# Patient Record
Sex: MALE | Race: WHITE | HISPANIC OR LATINO | Employment: FULL TIME | URBAN - METROPOLITAN AREA
[De-identification: names, ages, dates, MRNs, and addresses within clinical notes are randomized per-mention and may not be internally consistent; named-entity substitution may affect disease eponyms.]

---

## 2020-09-19 ENCOUNTER — APPOINTMENT (OUTPATIENT)
Dept: RADIOLOGY | Facility: MEDICAL CENTER | Age: 35
End: 2020-09-19
Payer: OTHER MISCELLANEOUS

## 2020-09-19 ENCOUNTER — APPOINTMENT (OUTPATIENT)
Dept: RADIOLOGY | Facility: MEDICAL CENTER | Age: 35
End: 2020-09-19
Attending: EMERGENCY MEDICINE
Payer: OTHER MISCELLANEOUS

## 2020-09-19 ENCOUNTER — HOSPITAL ENCOUNTER (EMERGENCY)
Facility: MEDICAL CENTER | Age: 35
End: 2020-09-19
Attending: EMERGENCY MEDICINE
Payer: OTHER MISCELLANEOUS

## 2020-09-19 VITALS
HEART RATE: 91 BPM | TEMPERATURE: 98 F | RESPIRATION RATE: 18 BRPM | WEIGHT: 190 LBS | BODY MASS INDEX: 30.53 KG/M2 | SYSTOLIC BLOOD PRESSURE: 120 MMHG | HEIGHT: 66 IN | DIASTOLIC BLOOD PRESSURE: 78 MMHG | OXYGEN SATURATION: 98 %

## 2020-09-19 DIAGNOSIS — S20.219A CONTUSION OF CHEST WALL, UNSPECIFIED LATERALITY, INITIAL ENCOUNTER: ICD-10-CM
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
## 2020-09-19 DIAGNOSIS — S16.1XXA STRAIN OF NECK MUSCLE, INITIAL ENCOUNTER: ICD-10-CM

## 2020-09-19 DIAGNOSIS — S09.90XA CLOSED HEAD INJURY, INITIAL ENCOUNTER: ICD-10-CM

## 2020-09-19 DIAGNOSIS — S39.012A STRAIN OF LUMBAR REGION, INITIAL ENCOUNTER: ICD-10-CM

## 2020-09-19 PROCEDURE — 700102 HCHG RX REV CODE 250 W/ 637 OVERRIDE(OP): Performed by: EMERGENCY MEDICINE

## 2020-09-19 PROCEDURE — A9270 NON-COVERED ITEM OR SERVICE: HCPCS | Performed by: EMERGENCY MEDICINE

## 2020-09-19 PROCEDURE — 70450 CT HEAD/BRAIN W/O DYE: CPT

## 2020-09-19 PROCEDURE — 99284 EMERGENCY DEPT VISIT MOD MDM: CPT

## 2020-09-19 PROCEDURE — 305948 HCHG GREEN TRAUMA ACT PRE-NOTIFY NO CC

## 2020-09-19 PROCEDURE — 72100 X-RAY EXAM L-S SPINE 2/3 VWS: CPT

## 2020-09-19 PROCEDURE — 71045 X-RAY EXAM CHEST 1 VIEW: CPT

## 2020-09-19 PROCEDURE — 72125 CT NECK SPINE W/O DYE: CPT

## 2020-09-19 RX ORDER — HYDROCODONE BITARTRATE AND ACETAMINOPHEN 5; 325 MG/1; MG/1
1 TABLET ORAL ONCE
Status: COMPLETED | OUTPATIENT
Start: 2020-09-19 | End: 2020-09-19

## 2020-09-19 RX ADMIN — HYDROCODONE BITARTRATE AND ACETAMINOPHEN 1 TABLET: 5; 325 TABLET ORAL at 17:14

## 2020-09-19 SDOH — HEALTH STABILITY: MENTAL HEALTH: HOW OFTEN DO YOU HAVE A DRINK CONTAINING ALCOHOL?: NEVER

## 2020-09-19 ASSESSMENT — LIFESTYLE VARIABLES: DO YOU DRINK ALCOHOL: NO

## 2020-09-19 NOTE — Clinical Note
Antonella Sanders was seen and treated in our emergency department on 9/19/2020.  He may return to work on 09/21/2020.       If you have any questions or concerns, please don't hesitate to call.      Mg Moon M.D.
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
## 2020-09-20 NOTE — ED NOTES
Pt discharge home. Pt given discharge instructions  Pt verbalized understanding, all questions answered ,vss upon d/c. Pt steady on feet upon discharge.family will be driving pt home

## 2021-04-13 ENCOUNTER — IMMUNIZATION (OUTPATIENT)
Dept: FAMILY PLANNING/WOMEN'S HEALTH CLINIC | Facility: IMMUNIZATION CENTER | Age: 36
End: 2021-04-13
Payer: OTHER GOVERNMENT

## 2021-04-13 DIAGNOSIS — Z23 ENCOUNTER FOR VACCINATION: Primary | ICD-10-CM

## 2021-04-13 PROCEDURE — 91300 PFIZER SARS-COV-2 VACCINE: CPT

## 2021-04-13 PROCEDURE — 0001A PFIZER SARS-COV-2 VACCINE: CPT

## 2021-05-14 ENCOUNTER — IMMUNIZATION (OUTPATIENT)
Dept: FAMILY PLANNING/WOMEN'S HEALTH CLINIC | Facility: IMMUNIZATION CENTER | Age: 36
End: 2021-05-14
Attending: INTERNAL MEDICINE
Payer: OTHER GOVERNMENT

## 2021-05-14 DIAGNOSIS — Z23 ENCOUNTER FOR VACCINATION: Primary | ICD-10-CM

## 2021-05-14 PROCEDURE — 0002A PFIZER SARS-COV-2 VACCINE: CPT

## 2021-05-14 PROCEDURE — 91300 PFIZER SARS-COV-2 VACCINE: CPT

## 2022-11-06 ENCOUNTER — HOSPITAL ENCOUNTER (EMERGENCY)
Facility: MEDICAL CENTER | Age: 37
End: 2022-11-06
Attending: EMERGENCY MEDICINE

## 2022-11-06 ENCOUNTER — APPOINTMENT (OUTPATIENT)
Dept: RADIOLOGY | Facility: MEDICAL CENTER | Age: 37
End: 2022-11-06
Attending: EMERGENCY MEDICINE

## 2022-11-06 VITALS
RESPIRATION RATE: 20 BRPM | HEART RATE: 83 BPM | BODY MASS INDEX: 32.24 KG/M2 | OXYGEN SATURATION: 93 % | WEIGHT: 200.62 LBS | HEIGHT: 66 IN | SYSTOLIC BLOOD PRESSURE: 106 MMHG | DIASTOLIC BLOOD PRESSURE: 71 MMHG | TEMPERATURE: 98.8 F

## 2022-11-06 DIAGNOSIS — S41.112A LACERATION OF LEFT UPPER EXTREMITY, INITIAL ENCOUNTER: ICD-10-CM

## 2022-11-06 PROCEDURE — 90471 IMMUNIZATION ADMIN: CPT

## 2022-11-06 PROCEDURE — A9270 NON-COVERED ITEM OR SERVICE: HCPCS | Performed by: EMERGENCY MEDICINE

## 2022-11-06 PROCEDURE — 90715 TDAP VACCINE 7 YRS/> IM: CPT | Performed by: EMERGENCY MEDICINE

## 2022-11-06 PROCEDURE — 304999 HCHG REPAIR-SIMPLE/INTERMED LEVEL 1

## 2022-11-06 PROCEDURE — 99284 EMERGENCY DEPT VISIT MOD MDM: CPT

## 2022-11-06 PROCEDURE — 700102 HCHG RX REV CODE 250 W/ 637 OVERRIDE(OP): Performed by: EMERGENCY MEDICINE

## 2022-11-06 PROCEDURE — 73080 X-RAY EXAM OF ELBOW: CPT | Mod: LT

## 2022-11-06 PROCEDURE — 700111 HCHG RX REV CODE 636 W/ 250 OVERRIDE (IP): Performed by: EMERGENCY MEDICINE

## 2022-11-06 PROCEDURE — 700101 HCHG RX REV CODE 250: Performed by: EMERGENCY MEDICINE

## 2022-11-06 PROCEDURE — 303747 HCHG EXTRA SUTURE

## 2022-11-06 PROCEDURE — 304217 HCHG IRRIGATION SYSTEM

## 2022-11-06 RX ORDER — CEPHALEXIN 500 MG/1
500 CAPSULE ORAL 4 TIMES DAILY
Qty: 12 CAPSULE | Refills: 0 | Status: SHIPPED | OUTPATIENT
Start: 2022-11-06 | End: 2022-11-09

## 2022-11-06 RX ORDER — BUPIVACAINE HYDROCHLORIDE AND EPINEPHRINE 5; 5 MG/ML; UG/ML
10 INJECTION, SOLUTION EPIDURAL; INTRACAUDAL; PERINEURAL ONCE
Status: COMPLETED | OUTPATIENT
Start: 2022-11-06 | End: 2022-11-06

## 2022-11-06 RX ORDER — LIDOCAINE HYDROCHLORIDE AND EPINEPHRINE BITARTRATE 20; .01 MG/ML; MG/ML
10 INJECTION, SOLUTION SUBCUTANEOUS ONCE
Status: DISCONTINUED | OUTPATIENT
Start: 2022-11-06 | End: 2022-11-06

## 2022-11-06 RX ORDER — CEPHALEXIN 500 MG/1
500 CAPSULE ORAL ONCE
Status: COMPLETED | OUTPATIENT
Start: 2022-11-06 | End: 2022-11-06

## 2022-11-06 RX ADMIN — BUPIVACAINE HYDROCHLORIDE AND EPINEPHRINE BITARTRATE 10 ML: 5; .005 INJECTION, SOLUTION EPIDURAL; INTRACAUDAL; PERINEURAL at 18:30

## 2022-11-06 RX ADMIN — CEPHALEXIN 500 MG: 500 CAPSULE ORAL at 18:30

## 2022-11-06 RX ADMIN — CLOSTRIDIUM TETANI TOXOID ANTIGEN (FORMALDEHYDE INACTIVATED), CORYNEBACTERIUM DIPHTHERIAE TOXOID ANTIGEN (FORMALDEHYDE INACTIVATED), BORDETELLA PERTUSSIS TOXOID ANTIGEN (GLUTARALDEHYDE INACTIVATED), BORDETELLA PERTUSSIS FILAMENTOUS HEMAGGLUTININ ANTIGEN (FORMALDEHYDE INACTIVATED), BORDETELLA PERTUSSIS PERTACTIN ANTIGEN, AND BORDETELLA PERTUSSIS FIMBRIAE 2/3 ANTIGEN 0.5 ML: 5; 2; 2.5; 5; 3; 5 INJECTION, SUSPENSION INTRAMUSCULAR at 17:48
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
## 2022-11-07 NOTE — DISCHARGE INSTRUCTIONS
You were seen in the ER for a cut on your left arm which I have placed dung stitches in.  You should return to the ER in 7 to 10 days for suture removal.  I sent you in a prescription for antibiotics, please take them as directed.  If you develop redness, puslike drainage, red streaking, or fevers you should return immediately to the ER for IV antibiotics.  Establish with a primary care physician and return immediately with new or worsening symptoms.  I hope you feel better soon!
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
## 2023-05-02 ENCOUNTER — APPOINTMENT (OUTPATIENT)
Dept: RADIOLOGY | Facility: MEDICAL CENTER | Age: 38
End: 2023-05-02
Attending: EMERGENCY MEDICINE

## 2023-05-02 ENCOUNTER — HOSPITAL ENCOUNTER (EMERGENCY)
Facility: MEDICAL CENTER | Age: 38
End: 2023-05-02
Attending: EMERGENCY MEDICINE

## 2023-05-02 VITALS
SYSTOLIC BLOOD PRESSURE: 111 MMHG | RESPIRATION RATE: 18 BRPM | OXYGEN SATURATION: 95 % | TEMPERATURE: 98.6 F | HEART RATE: 100 BPM | HEIGHT: 65 IN | WEIGHT: 188 LBS | DIASTOLIC BLOOD PRESSURE: 63 MMHG | BODY MASS INDEX: 31.32 KG/M2

## 2023-05-02 DIAGNOSIS — S51.811A LACERATION OF RIGHT FOREARM, INITIAL ENCOUNTER: ICD-10-CM

## 2023-05-02 LAB
ABO + RH BLD: NORMAL
ABO GROUP BLD: NORMAL
ALBUMIN SERPL BCP-MCNC: 3.7 G/DL (ref 3.2–4.9)
ALBUMIN/GLOB SERPL: 1.1 G/DL
ALP SERPL-CCNC: 142 U/L (ref 30–99)
ALT SERPL-CCNC: 44 U/L (ref 2–50)
ANION GAP SERPL CALC-SCNC: 15 MMOL/L (ref 7–16)
APTT PPP: 27.8 SEC (ref 24.7–36)
AST SERPL-CCNC: 33 U/L (ref 12–45)
BASOPHILS # BLD AUTO: 0.7 % (ref 0–1.8)
BASOPHILS # BLD: 0.08 K/UL (ref 0–0.12)
BILIRUB SERPL-MCNC: 0.3 MG/DL (ref 0.1–1.5)
BLD GP AB SCN SERPL QL: NORMAL
BUN SERPL-MCNC: 18 MG/DL (ref 8–22)
CALCIUM ALBUM COR SERPL-MCNC: 8.9 MG/DL (ref 8.5–10.5)
CALCIUM SERPL-MCNC: 8.7 MG/DL (ref 8.5–10.5)
CHLORIDE SERPL-SCNC: 107 MMOL/L (ref 96–112)
CO2 SERPL-SCNC: 20 MMOL/L (ref 20–33)
CREAT SERPL-MCNC: 1.31 MG/DL (ref 0.5–1.4)
EOSINOPHIL # BLD AUTO: 0.38 K/UL (ref 0–0.51)
EOSINOPHIL NFR BLD: 3.5 % (ref 0–6.9)
ERYTHROCYTE [DISTWIDTH] IN BLOOD BY AUTOMATED COUNT: 41.1 FL (ref 35.9–50)
GFR SERPLBLD CREATININE-BSD FMLA CKD-EPI: 71 ML/MIN/1.73 M 2
GLOBULIN SER CALC-MCNC: 3.4 G/DL (ref 1.9–3.5)
GLUCOSE SERPL-MCNC: 132 MG/DL (ref 65–99)
HCT VFR BLD AUTO: 38.9 % (ref 42–52)
HGB BLD-MCNC: 13.9 G/DL (ref 14–18)
IMM GRANULOCYTES # BLD AUTO: 0.04 K/UL (ref 0–0.11)
IMM GRANULOCYTES NFR BLD AUTO: 0.4 % (ref 0–0.9)
INR PPP: 1.01 (ref 0.87–1.13)
LYMPHOCYTES # BLD AUTO: 4.33 K/UL (ref 1–4.8)
LYMPHOCYTES NFR BLD: 39.5 % (ref 22–41)
MCH RBC QN AUTO: 32.1 PG (ref 27–33)
MCHC RBC AUTO-ENTMCNC: 35.7 G/DL (ref 33.7–35.3)
MCV RBC AUTO: 89.8 FL (ref 81.4–97.8)
MONOCYTES # BLD AUTO: 0.92 K/UL (ref 0–0.85)
MONOCYTES NFR BLD AUTO: 8.4 % (ref 0–13.4)
NEUTROPHILS # BLD AUTO: 5.21 K/UL (ref 1.82–7.42)
NEUTROPHILS NFR BLD: 47.5 % (ref 44–72)
NRBC # BLD AUTO: 0 K/UL
NRBC BLD-RTO: 0 /100 WBC
PLATELET # BLD AUTO: 222 K/UL (ref 164–446)
PMV BLD AUTO: 11.4 FL (ref 9–12.9)
POTASSIUM SERPL-SCNC: 3.7 MMOL/L (ref 3.6–5.5)
PROT SERPL-MCNC: 7.1 G/DL (ref 6–8.2)
PROTHROMBIN TIME: 13.2 SEC (ref 12–14.6)
RBC # BLD AUTO: 4.33 M/UL (ref 4.7–6.1)
RH BLD: NORMAL
SODIUM SERPL-SCNC: 142 MMOL/L (ref 135–145)
WBC # BLD AUTO: 11 K/UL (ref 4.8–10.8)

## 2023-05-02 PROCEDURE — 86901 BLOOD TYPING SEROLOGIC RH(D): CPT

## 2023-05-02 PROCEDURE — 80053 COMPREHEN METABOLIC PANEL: CPT

## 2023-05-02 PROCEDURE — 304217 HCHG IRRIGATION SYSTEM

## 2023-05-02 PROCEDURE — 85025 COMPLETE CBC W/AUTO DIFF WBC: CPT

## 2023-05-02 PROCEDURE — 36415 COLL VENOUS BLD VENIPUNCTURE: CPT

## 2023-05-02 PROCEDURE — 85610 PROTHROMBIN TIME: CPT

## 2023-05-02 PROCEDURE — 305308 HCHG STAPLER,SKIN,DISP.

## 2023-05-02 PROCEDURE — 86850 RBC ANTIBODY SCREEN: CPT

## 2023-05-02 PROCEDURE — 86900 BLOOD TYPING SEROLOGIC ABO: CPT

## 2023-05-02 PROCEDURE — 73206 CT ANGIO UPR EXTRM W/O&W/DYE: CPT | Mod: RT

## 2023-05-02 PROCEDURE — 99284 EMERGENCY DEPT VISIT MOD MDM: CPT

## 2023-05-02 PROCEDURE — 304992 HCHG REPAIR-COMPLEX-LVL 1,1.1-7.5CM

## 2023-05-02 PROCEDURE — 700117 HCHG RX CONTRAST REV CODE 255: Performed by: EMERGENCY MEDICINE

## 2023-05-02 PROCEDURE — 700101 HCHG RX REV CODE 250: Performed by: EMERGENCY MEDICINE

## 2023-05-02 PROCEDURE — 85730 THROMBOPLASTIN TIME PARTIAL: CPT

## 2023-05-02 PROCEDURE — 700105 HCHG RX REV CODE 258: Performed by: EMERGENCY MEDICINE

## 2023-05-02 RX ORDER — SODIUM CHLORIDE 9 MG/ML
1000 INJECTION, SOLUTION INTRAVENOUS ONCE
Status: COMPLETED | OUTPATIENT
Start: 2023-05-02 | End: 2023-05-02

## 2023-05-02 RX ORDER — LIDOCAINE HYDROCHLORIDE AND EPINEPHRINE 10; 10 MG/ML; UG/ML
20 INJECTION, SOLUTION INFILTRATION; PERINEURAL ONCE
Status: DISCONTINUED | OUTPATIENT
Start: 2023-05-02 | End: 2023-05-02

## 2023-05-02 RX ADMIN — IOHEXOL 80 ML: 350 INJECTION, SOLUTION INTRAVENOUS at 20:45

## 2023-05-02 RX ADMIN — SODIUM CHLORIDE 1000 ML: 9 INJECTION, SOLUTION INTRAVENOUS at 19:53

## 2023-05-02 RX ADMIN — LIDOCAINE HYDROCHLORIDE 10 ML: 10; .005 INJECTION, SOLUTION EPIDURAL; INFILTRATION; INTRACAUDAL; PERINEURAL at 19:51
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
## 2023-05-03 NOTE — ED NOTES
Pt provided discharge instructions and signs and symptoms of infection at site. Pt verbalizes understanding and has no questions at this time. Pt ambulates from ED with steady gait in possession of all belongings.
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
## 2023-05-03 NOTE — ED NOTES
Blood draw completed. Pt requesting water. Updated on plan of care. Call light within reach. No other needs at this time.

## 2023-05-03 NOTE — DISCHARGE INSTRUCTIONS
Stay well-hydrated and change the dressing tomorrow night as discussed.  Return for persistent bleeding, dizziness, or signs of infection

## 2023-05-05 ENCOUNTER — APPOINTMENT (OUTPATIENT)
Dept: RADIOLOGY | Facility: MEDICAL CENTER | Age: 38
End: 2023-05-05
Attending: STUDENT IN AN ORGANIZED HEALTH CARE EDUCATION/TRAINING PROGRAM

## 2023-05-05 ENCOUNTER — HOSPITAL ENCOUNTER (EMERGENCY)
Facility: MEDICAL CENTER | Age: 38
End: 2023-05-06
Attending: STUDENT IN AN ORGANIZED HEALTH CARE EDUCATION/TRAINING PROGRAM

## 2023-05-05 DIAGNOSIS — R22.31 LOCALIZED SWELLING OF RIGHT FOREARM: ICD-10-CM

## 2023-05-05 DIAGNOSIS — T14.8XXA HEMATOMA: ICD-10-CM

## 2023-05-05 DIAGNOSIS — S51.811D LACERATION OF RIGHT FOREARM, SUBSEQUENT ENCOUNTER: ICD-10-CM

## 2023-05-05 LAB
ALBUMIN SERPL BCP-MCNC: 4 G/DL (ref 3.2–4.9)
ALBUMIN/GLOB SERPL: 1.2 G/DL
ALP SERPL-CCNC: 114 U/L (ref 30–99)
ALT SERPL-CCNC: 27 U/L (ref 2–50)
ANION GAP SERPL CALC-SCNC: 10 MMOL/L (ref 7–16)
AST SERPL-CCNC: 23 U/L (ref 12–45)
BILIRUB SERPL-MCNC: 0.2 MG/DL (ref 0.1–1.5)
BLOOD CULTURE HOLD CXBCH: NORMAL
BUN SERPL-MCNC: 17 MG/DL (ref 8–22)
CALCIUM ALBUM COR SERPL-MCNC: 9 MG/DL (ref 8.5–10.5)
CALCIUM SERPL-MCNC: 9 MG/DL (ref 8.5–10.5)
CHLORIDE SERPL-SCNC: 102 MMOL/L (ref 96–112)
CO2 SERPL-SCNC: 24 MMOL/L (ref 20–33)
CREAT SERPL-MCNC: 0.99 MG/DL (ref 0.5–1.4)
GFR SERPLBLD CREATININE-BSD FMLA CKD-EPI: 99 ML/MIN/1.73 M 2
GLOBULIN SER CALC-MCNC: 3.4 G/DL (ref 1.9–3.5)
GLUCOSE SERPL-MCNC: 97 MG/DL (ref 65–99)
POTASSIUM SERPL-SCNC: 3.9 MMOL/L (ref 3.6–5.5)
PROT SERPL-MCNC: 7.4 G/DL (ref 6–8.2)
SODIUM SERPL-SCNC: 136 MMOL/L (ref 135–145)

## 2023-05-05 PROCEDURE — 73201 CT UPPER EXTREMITY W/DYE: CPT | Mod: RT

## 2023-05-05 PROCEDURE — 99283 EMERGENCY DEPT VISIT LOW MDM: CPT

## 2023-05-05 PROCEDURE — 36415 COLL VENOUS BLD VENIPUNCTURE: CPT

## 2023-05-05 PROCEDURE — 85025 COMPLETE CBC W/AUTO DIFF WBC: CPT

## 2023-05-05 PROCEDURE — 700117 HCHG RX CONTRAST REV CODE 255: Performed by: STUDENT IN AN ORGANIZED HEALTH CARE EDUCATION/TRAINING PROGRAM

## 2023-05-05 PROCEDURE — 80053 COMPREHEN METABOLIC PANEL: CPT

## 2023-05-05 RX ADMIN — IOHEXOL 100 ML: 350 INJECTION, SOLUTION INTRAVENOUS at 22:59

## 2023-05-05 ASSESSMENT — FIBROSIS 4 INDEX: FIB4 SCORE: 0.87

## 2023-05-06 VITALS
RESPIRATION RATE: 16 BRPM | BODY MASS INDEX: 31.5 KG/M2 | SYSTOLIC BLOOD PRESSURE: 118 MMHG | HEIGHT: 66 IN | HEART RATE: 84 BPM | DIASTOLIC BLOOD PRESSURE: 58 MMHG | OXYGEN SATURATION: 96 % | WEIGHT: 195.99 LBS | TEMPERATURE: 98 F

## 2023-05-06 LAB
BASOPHILS # BLD AUTO: 0.6 % (ref 0–1.8)
BASOPHILS # BLD: 0.05 K/UL (ref 0–0.12)
EOSINOPHIL # BLD AUTO: 0.29 K/UL (ref 0–0.51)
EOSINOPHIL NFR BLD: 3.4 % (ref 0–6.9)
ERYTHROCYTE [DISTWIDTH] IN BLOOD BY AUTOMATED COUNT: 43.4 FL (ref 35.9–50)
HCT VFR BLD AUTO: 30.6 % (ref 42–52)
HGB BLD-MCNC: 10.2 G/DL (ref 14–18)
IMM GRANULOCYTES # BLD AUTO: 0.05 K/UL (ref 0–0.11)
IMM GRANULOCYTES NFR BLD AUTO: 0.6 % (ref 0–0.9)
LYMPHOCYTES # BLD AUTO: 2.79 K/UL (ref 1–4.8)
LYMPHOCYTES NFR BLD: 33.2 % (ref 22–41)
MCH RBC QN AUTO: 31.6 PG (ref 27–33)
MCHC RBC AUTO-ENTMCNC: 33.3 G/DL (ref 33.7–35.3)
MCV RBC AUTO: 94.7 FL (ref 81.4–97.8)
MONOCYTES # BLD AUTO: 0.59 K/UL (ref 0–0.85)
MONOCYTES NFR BLD AUTO: 7 % (ref 0–13.4)
NEUTROPHILS # BLD AUTO: 4.64 K/UL (ref 1.82–7.42)
NEUTROPHILS NFR BLD: 55.2 % (ref 44–72)
NRBC # BLD AUTO: 0 K/UL
NRBC BLD-RTO: 0 /100 WBC
PLATELET # BLD AUTO: 209 K/UL (ref 164–446)
PMV BLD AUTO: 11.3 FL (ref 9–12.9)
RBC # BLD AUTO: 3.23 M/UL (ref 4.7–6.1)
WBC # BLD AUTO: 8.4 K/UL (ref 4.8–10.8)

## 2023-05-06 NOTE — ED PROVIDER NOTES
"ED Provider Note    CHIEF COMPLAINT  Chief Complaint   Patient presents with    Wound Infection     The pt reports cut on right arm last week from knife. The pt was seen, treated, and received staples. The right arm is swollen, hot, and red. Positive csm in the affected extremity. The pt denies pus coming from staple site. The pt denies pain       EXTERNAL RECORDS REVIEWED  Other was seen in the ER on 2 May for a laceration to his right forearm.  He did have a CTA which did not show any evidence of arterial injury.  The wound was explored and repaired with staples.    HPI/ROS  LIMITATION TO HISTORY   Select: Language Setswana,  Used   OUTSIDE HISTORIAN(S):  None    Antonella Walker Sanders is a 37 y.o. male who presents with swelling of his right upper extremity.  Patient accidentally cut his forearm with a knife on 2 May after tripping and falling.  He said that the swelling started to increase last night.  He denies any pain.  He does feel slight numbness to the affected area.  He can move all his fingers without pain or difficulty.  He denies any fevers or chills does have some nausea but no vomiting.  He denies any purulent drainage from the wound.     PAST MEDICAL HISTORY   Denies    SURGICAL HISTORY  patient denies any surgical history    FAMILY HISTORY  No family history on file.    SOCIAL HISTORY  Social History     Tobacco Use    Smoking status: Never    Smokeless tobacco: Never   Substance and Sexual Activity    Alcohol use: Never    Drug use: Never    Sexual activity: Not on file       CURRENT MEDICATIONS  Home Medications       Reviewed by Donaldo Pablo R.N. (Registered Nurse) on 05/05/23 at 1959  Med List Status: Partial     Medication Last Dose Status        Patient Arya Taking any Medications                           ALLERGIES  No Known Allergies    PHYSICAL EXAM  VITAL SIGNS: /58   Pulse 84   Temp 36.7 °C (98 °F) (Temporal)   Resp 16   Ht 1.676 m (5' 6\")   Wt 88.9 kg (195 " lb 15.8 oz)   SpO2 96%   BMI 31.63 kg/m²    Constitutional: Awake and alert. Nontoxic  HENT:  Grossly normal  Eyes: Grossly normal  Neck: Normal range of motion  Cardiovascular: Normal heart rate   Thorax & Lungs: No respiratory distress  Abdomen: Nontender  Skin:  No pathologic rash.   Extremities: Right upper extremity with 3 cm laceration with staples in place.  There is no surrounding erythema, edema or purulent drainage to wound.  There is ecchymosis to the right forearm and small area of fluctuance adjacent to the laceration.  I am unable to appreciate any warmth or tenderness (I appreciate the discrepancy from triage note).  Motor and sensory exams intact distally.  He has palpable radial and ulnar pulses.    Psychiatric: Affect normal      DIAGNOSTIC STUDIES / PROCEDURES      LABS  Results for orders placed or performed during the hospital encounter of 05/05/23   COMP METABOLIC PANEL   Result Value Ref Range    Sodium 136 135 - 145 mmol/L    Potassium 3.9 3.6 - 5.5 mmol/L    Chloride 102 96 - 112 mmol/L    Co2 24 20 - 33 mmol/L    Anion Gap 10.0 7.0 - 16.0    Glucose 97 65 - 99 mg/dL    Bun 17 8 - 22 mg/dL    Creatinine 0.99 0.50 - 1.40 mg/dL    Calcium 9.0 8.5 - 10.5 mg/dL    AST(SGOT) 23 12 - 45 U/L    ALT(SGPT) 27 2 - 50 U/L    Alkaline Phosphatase 114 (H) 30 - 99 U/L    Total Bilirubin 0.2 0.1 - 1.5 mg/dL    Albumin 4.0 3.2 - 4.9 g/dL    Total Protein 7.4 6.0 - 8.2 g/dL    Globulin 3.4 1.9 - 3.5 g/dL    A-G Ratio 1.2 g/dL   CBC WITH DIFFERENTIAL   Result Value Ref Range    WBC 8.4 4.8 - 10.8 K/uL    RBC 3.23 (L) 4.70 - 6.10 M/uL    Hemoglobin 10.2 (L) 14.0 - 18.0 g/dL    Hematocrit 30.6 (L) 42.0 - 52.0 %    MCV 94.7 81.4 - 97.8 fL    MCH 31.6 27.0 - 33.0 pg    MCHC 33.3 (L) 33.7 - 35.3 g/dL    RDW 43.4 35.9 - 50.0 fL    Platelet Count 209 164 - 446 K/uL    MPV 11.3 9.0 - 12.9 fL    Neutrophils-Polys 55.20 44.00 - 72.00 %    Lymphocytes 33.20 22.00 - 41.00 %    Monocytes 7.00 0.00 - 13.40 %     Eosinophils 3.40 0.00 - 6.90 %    Basophils 0.60 0.00 - 1.80 %    Immature Granulocytes 0.60 0.00 - 0.90 %    Nucleated RBC 0.00 /100 WBC    Neutrophils (Absolute) 4.64 1.82 - 7.42 K/uL    Lymphs (Absolute) 2.79 1.00 - 4.80 K/uL    Monos (Absolute) 0.59 0.00 - 0.85 K/uL    Eos (Absolute) 0.29 0.00 - 0.51 K/uL    Baso (Absolute) 0.05 0.00 - 0.12 K/uL    Immature Granulocytes (abs) 0.05 0.00 - 0.11 K/uL    NRBC (Absolute) 0.00 K/uL   CORRECTED CALCIUM   Result Value Ref Range    Correct Calcium 9.0 8.5 - 10.5 mg/dL   ESTIMATED GFR   Result Value Ref Range    GFR (CKD-EPI) 99 >60 mL/min/1.73 m 2   Blood Culture,Hold   Result Value Ref Range    Blood Culture Hold Collected          RADIOLOGY  I have independently interpreted the diagnostic imaging associated with this visit and am waiting the final reading from the radiologist.   My preliminary interpretation is as follows: CT with fluid collection  Radiologist interpretation:   CT-EXTREMITY, UPPER WITH RIGHT   Final Result      1.  Complex fluid collection is identified at site of surgical staples in the skin surface that extends proximally through the subcutaneous fat and into the adjacent anterior compartment musculature as described above. Differential diagnosis includes    hematoma. Infection of the fluid collection is a possibility although no soft tissue emphysema is present.      2.  There is some decreased attenuation within the adjacent anterior compartment musculature anterior to the radius which could indicate hematoma within the muscle or potentially myositis.      3.  No soft tissue emphysema or air within the collection is identified.      4.  No bone erosion or bone destruction is identified.            COURSE & MEDICAL DECISION MAKING    ED Observation Status? Yes; I am placing the patient in to an observation status due to a diagnostic uncertainty as well as therapeutic intensity. Patient placed in observation status at 8:46 PM, 5/5/2023.      Observation plan is as follows: IV, Labs, CT    Upon Reevaluation, the patient's condition has: Improved; and will be discharged.    Patient discharged from ED Observation status at 1.15 AM 5/6/2023    INITIAL ASSESSMENT, COURSE AND PLAN  Care Narrative: This is a 37-year-old Nauruan-speaking male who was seen in the emergency department a couple days ago for a deep laceration to his forearm.  He returns today for increased swelling though he fortunately does not have any increase in his pain or new neurovascular deficit.  His exam above does show significant ecchymosis which is quite consistent with a hematoma.  I am unable to appreciate any warmth, crepitus or tenderness to palpation and his exam is not consistent with a superficial soft tissue infection.  I did obtain a CT scan to further evaluate for any deeper space infection and this was reviewed by the radiologist.  There is a complex fluid collection though this is most consistent with a hematoma.  There is no soft tissue emphysema or other signs to suggest an infection.  There was also a question of possible myositis however the patient is not having any pain or tenderness in his forearm and therefore this seems much less likely than a muscular hematoma.  There is fortunately no bony erosion or bony destruction identified as well.  His compartments are soft and he has no signs of compartment syndrome.  Reassuringly, the patient is afebrile has normal vitals, has no leukocytosis and is systemically well-appearing.  I expressed to the patient that although there is low likelihood that there is an infection at this time, he is at risk of developing an infection of the hematoma or the wound itself.  I therefore stressed that it is imperative that he obtains close follow-up in the next few days for repeat evaluation.  I advised that he return immediately to the ER if he develops fevers, chills, increased pain, warmth, redness or he has any other new  concerns.  This was all explained to the patient with the use of a  and he expresses understanding and was discharged home in improved condition.    DISPOSITION AND DISCUSSIONS  I have discussed management of the patient with the following physicians and DA's:  None    Discussion of management with other [facility] or appropriate source(s): None     Escalation of care considered, and ultimately not performed:acute inpatient care management, however at this time, the patient is most appropriate for outpatient management    Barriers to care at this time, including but not limited to:  No PCP, referral placed .     Decision tools and prescription drugs considered including, but not limited to: Antibiotics Not indicated see discussion above .    FINAL DIAGNOSIS  1. Hematoma Acute   2. Localized swelling of right forearm Acute   3. Laceration of right forearm, subsequent encounter           Electronically signed by: Susie Goodman M.D., 5/5/2023 8:32 PM

## 2023-05-06 NOTE — ED TRIAGE NOTES
"Chief Complaint   Patient presents with    Wound Infection     The pt reports cut on right arm last week from knife. The pt was seen, treated, and received staples. The right arm is swollen, hot, and red. Positive csm in the affected extremity. The pt denies pus coming from staple site. The pt denies pain       Pt ambulatory to triage. Pt A&Ox4, for the above complaint.     Pt to lobby . Pt educated on alerting staff in changes to condition. Pt verbalized understanding.     /78   Pulse 84   Temp 36.9 °C (98.4 °F) (Temporal)   Resp 18   Ht 1.676 m (5' 6\")   Wt 88.9 kg (195 lb 15.8 oz)   SpO2 97% Comment: Room air  BMI 31.63 kg/m²     "

## 2023-05-06 NOTE — ED NOTES
Upon asking pt to get undressed and assessing IV noted swelling around IV and throughout left arm. Asked pt if his arm hurt, reported no but said it didn't look the same as earlier. IV infiltrated, removed. Ace wrap applied and hot pack, extremity elevated on multiple pillows. ERP to be updated. Connected to monitoring, VSS at this time. Family at bedside and call light within reach.

## 2023-05-06 NOTE — ED NOTES
DC papers provided, all questions addressed at this time. Pt ambulated off unit with steady gait.

## 2023-05-06 NOTE — DISCHARGE INSTRUCTIONS
Although it does not appear that you have any infection at this time you are at risk of developing an infection.  It is therefore very important that you have your wounds reevaluated in 2 to 3 days.  I will place a referral so that you can establish with a primary doctor and if you are unable to be seen in this time you can return to the ER for repeat evaluation.  Please return to the ER sooner if you develop fever, chills, nausea, vomiting, increased increased pain, redness, warmth or swelling.

## 2023-05-06 NOTE — ED NOTES
ERP to bedside to check infiltrated arm. Distal pulses intact, pt still has no complaints of pain. Ready for DC.

## 2023-05-12 ENCOUNTER — TELEPHONE (OUTPATIENT)
Dept: HEALTH INFORMATION MANAGEMENT | Facility: OTHER | Age: 38
End: 2023-05-12

## 2023-05-28 ENCOUNTER — APPOINTMENT (OUTPATIENT)
Dept: RADIOLOGY | Facility: MEDICAL CENTER | Age: 38
End: 2023-05-28
Attending: STUDENT IN AN ORGANIZED HEALTH CARE EDUCATION/TRAINING PROGRAM

## 2023-05-28 ENCOUNTER — HOSPITAL ENCOUNTER (EMERGENCY)
Facility: MEDICAL CENTER | Age: 38
End: 2023-05-28
Attending: STUDENT IN AN ORGANIZED HEALTH CARE EDUCATION/TRAINING PROGRAM

## 2023-05-28 VITALS
SYSTOLIC BLOOD PRESSURE: 119 MMHG | WEIGHT: 195 LBS | DIASTOLIC BLOOD PRESSURE: 79 MMHG | OXYGEN SATURATION: 95 % | RESPIRATION RATE: 15 BRPM | HEART RATE: 74 BPM | TEMPERATURE: 98.1 F | HEIGHT: 66 IN | BODY MASS INDEX: 31.34 KG/M2

## 2023-05-28 DIAGNOSIS — I72.9 PSEUDOANEURYSM (HCC): ICD-10-CM

## 2023-05-28 PROCEDURE — 700101 HCHG RX REV CODE 250: Performed by: SURGERY

## 2023-05-28 PROCEDURE — 93926 LOWER EXTREMITY STUDY: CPT | Mod: 26,RT | Performed by: INTERNAL MEDICINE

## 2023-05-28 PROCEDURE — 93931 UPPER EXTREMITY STUDY: CPT | Mod: 26,RT | Performed by: INTERNAL MEDICINE

## 2023-05-28 PROCEDURE — 99283 EMERGENCY DEPT VISIT LOW MDM: CPT

## 2023-05-28 PROCEDURE — 93922 UPR/L XTREMITY ART 2 LEVELS: CPT | Mod: RT

## 2023-05-28 RX ADMIN — THROMBIN, TOPICAL (BOVINE) 5000 UNITS: KIT at 23:32

## 2023-05-28 ASSESSMENT — FIBROSIS 4 INDEX: FIB4 SCORE: 0.85

## 2023-05-29 PROCEDURE — 99283 EMERGENCY DEPT VISIT LOW MDM: CPT | Performed by: SURGERY

## 2023-05-29 NOTE — ED PROVIDER NOTES
"ED Provider Note    CHIEF COMPLAINT  Chief Complaint   Patient presents with    Wound Check     Pt arrives for staple removal of a laceration to the R forearm, pt was unsure of when the staples needed to be removed  2 staples remain, pt states removing 1 staple 2 days ago  Laceration does not appear to show signs of infection   There is a golf ball sized area of swelling, firm to touch adjacent to the wound   Pt states the swelling occurred a few days after having the staples placed    [id number] Abbe       EXTERNAL RECORDS REVIEWED  Inpatient Notes CTA done 5/2/2023 with the initial injury did show narrowing of the radial artery adjacent to the laceration ; patient was seen here again 5/5/2023 for swelling and concern for possible abscess, thought to be hematoma    HPI/ROS  LIMITATION TO HISTORY   Select: Language Paraguayan,  Used       Antonella Cardoso Lacho Sanders is a 38 y.o. male who presents with increased swelling proximal to a laceration on his right forearm.  Patient had 3 staples placed 5/2/2023 for a laceration to the forearm.  Patient took a staple out himself 2 days ago and states he then developed an area of increased swelling near the wound.  Patient states he initially had some swelling there a few days after the staples were placed but it has gotten larger since he took the staples out.    PAST MEDICAL HISTORY  No past medical history on file.     SURGICAL HISTORY  History reviewed. No pertinent surgical history.     FAMILY HISTORY  History reviewed. No pertinent family history.    SOCIAL HISTORY       CURRENT MEDICATIONS  Home Medications    Not on File       ALLERGIES  No Known Allergies    PHYSICAL EXAM  /79   Pulse 74   Temp 36.7 °C (98.1 °F) (Temporal)   Resp 15   Ht 1.676 m (5' 6\")   Wt 88.5 kg (195 lb)   SpO2 95%   Constitutional: Alert in no apparent distress.  HENT: No signs of trauma, Bilateral external ears normal, Nose normal.   Eyes: Pupils are equal and " reactive, Conjunctiva normal, Non-icteric.   Neck: Normal range of motion, No tenderness, Supple, No stridor.   Cardiovascular: Regular rate and rhythm, no murmurs.   Thorax & Lungs: Normal breath sounds, No respiratory distress, No wheezing  Abdomen: Soft, No tenderness, No peritoneal signs, No masses, No pulsatile masses.   Skin: Warm, Dry, No erythema, No rash.   Extremities: R forearm 3 cm diameter pulsatile area of swelling at the proximal end of a 3 cm laceration with 2 staples remaining.  No active bleeding.  Intact distal radial pulse although slightly weak.  Good perfusion of hands, full normal strength of hands.  Neurologic: Alert , Normal motor function, Normal speech, No focal deficits noted.   Psychiatric: Affect normal, Judgment normal, Mood normal.         DIAGNOSTIC STUDIES / PROCEDURES    RADIOLOGY  I have independently interpreted the diagnostic imaging associated with this visit and am waiting the final reading from the radiologist.     Radiologist interpretation:   US-EXTREMITY ARTERY UPPER UNILAT W/WBI (COMBO)   Final Result          COURSE & MEDICAL DECISION MAKING    ED Observation Status? Yes; I am placing the patient in to an observation status due to a diagnostic uncertainty as well as therapeutic intensity. Patient placed in observation status at 9:19 PM, 5/28/2023.     Observation plan is as follows: Ultrasound of the forearm, discuss with vascular    Upon Reevaluation, the patient's condition has: Improved; and will be discharged.    Patient discharged from ED Observation status at 11:44 PM (Time) 05/28/23 (Date).     INITIAL ASSESSMENT, COURSE AND PLAN  Care Narrative:     9:20 PM  38-year-old male presenting with concern for staple removal and mass near the suture.  On exam the mass near the suture is pulsatile concerning for possible aneurysm/pseudoaneurysm.  It has been large since the staple was removed at home by the patient, the other staples are certainly ready to come out from a  wound healing standpoint however there is no sign infection right now and given the increase in swelling with the removal of the first stable by the patient I would prefer to hold off at this time on removing additional staples until further work-up is done.  I have discussed with radiology (Dr. Lewis) the best study and we will obtain ultrasound of the arteries for further evaluation, likely discuss with vascular once the study is done.    10:37 PM  Ultrasound does show pseudoaneurysm.  I spoke with Dr. Erwin from vascular surgery, he will come see the patient.  Likely will require surgery.    11:44 PM  Spoke with Dr. Erwin.  He did a thrombin injection and remove staples and applied dressing.  We will follow-up with patient in office in 2 weeks.      ADDITIONAL PROBLEM LIST    Pseudoaneurysm of right radial artery    DISPOSITION AND DISCUSSIONS  I have discussed management of the patient with the following physicians and DA's:  Dr. Erwin-- vascular surgery    Discussion of management with other [facility] or appropriate source(s): Radiologist Dr. Lewis      Escalation of care considered, and ultimately not performed:acute inpatient care management, however at this time, the patient is most appropriate for outpatient management    Barriers to care at this time, including but not limited to: Patient does not have established PCP.     Discharged home in stable condition    FINAL DIAGNOSIS  1. Pseudoaneurysm (HCC) Acute Referral to Vascular Surgery            Electronically signed by: Renetta Barnard M.D., 05/28/23 9:10 PM

## 2023-05-29 NOTE — CONSULTS
Vascular Surgery          New Patient Consultation    Patient:Antonella Sanders  MRN:[id number]    Date: 5/29/2023    Referring Provider: Renetta Barnard    Consulting Physician: Devonte Erwin MD  _____________________________________________________    Reason for consultation:  Right radial artery pseudoaneurysm    HPI:  This is a 38 y.o. male who was seen in the ER on 5/2/2023 for a right forearm laceration.  No active bleeding was appreciated at that time and the laceration was stapled shut.  Patient presented tonight with swelling at the area which had some pulsation to it and ultrasound has confirmed there is a pseudoaneurysm of the radial artery underlying the laceration.  The neck appears relatively small and the pseudoaneurysm is amenable to thrombin injection.  There is no evidence of infection and the overlying incision is fully healed.  Deejay's test was performed with the radial artery compressed and there is still excellent perfusion of the entire hand.  This was confirmed with pulse oximetry on the thumb which showed no change in number or waveform with the radial artery compressed, and specifically the waveform is normal and the oximetry is about 94%.    History reviewed. No pertinent past medical history.    History reviewed. No pertinent surgical history.    Current Facility-Administered Medications   Medication Dose Route Frequency Provider Last Rate Last Admin    thrombin (THROMBINAR) 5000 UNIT vial   Topical See Admin Instructions Devonte Erwin M.D.   5,000 Units at 05/28/23 6667     No current outpatient medications on file.       Social History     Socioeconomic History    Marital status:      Spouse name: Not on file    Number of children: Not on file    Years of education: Not on file    Highest education level: Not on file   Occupational History    Not on file   Tobacco Use    Smoking status: Never    Smokeless tobacco: Never   Substance and  "Sexual Activity    Alcohol use: Never    Drug use: Never    Sexual activity: Not on file   Other Topics Concern    Not on file   Social History Narrative    Not on file     Social Determinants of Health     Financial Resource Strain: Not on file   Food Insecurity: Not on file   Transportation Needs: Not on file   Physical Activity: Not on file   Stress: Not on file   Social Connections: Not on file   Intimate Partner Violence: Not on file   Housing Stability: Not on file       History reviewed. No pertinent family history.    Allergies:  Patient has no known allergies.    Review of Systems:    Constitutional: Negative for fever or chills  HENT:   Negative for hearing loss or tinnitus    Eyes:    Negative for blurred vision or loss of vision  Respiratory:  Negative for cough or hemoptysis  Cardiac:  Negative for chest pain or palpitations  Vascular:  Negative for claudication, Negative for rest pain  Gastrointestinal: Negative for vomiting or abdominal pain     Negative for hematochezia or melena   Genitourinary: Negative for dysuria or hematuria   Musculoskeletal: Negative for myalgias or acute joint pain  Skin:   Negative for itching or rash  Neurological:  Negative for dizziness or headaches     Negative for speech disturbance     Negative for extremity weakness or paresthesias  Endo/Heme:  Negative for easy bruising or bleeding    Physical Exam:  /79   Pulse 74   Temp 36.7 °C (98.1 °F) (Temporal)   Resp 15   Ht 1.676 m (5' 6\")   Wt 88.5 kg (195 lb)   SpO2 95%   BMI 31.47 kg/m²     Constitutional: Alert, oriented, no acute distress  HEENT:  Normocephalic and atraumatic, EOMI  Neck:   Supple, no JVD,   Cardiovascular: Regular rate and rhythm,   Pulmonary:  Good air entry bilaterally,    Abdominal:  Soft, non-tender, non-distended       Musculoskeletal: No tenderness, no deformity  Neurological:  CN II-XII grossly intact, no focal deficits  Skin:   Skin is warm and dry. No rash noted.  Vascular " exam:    RUE: warm, cap refill<3 seconds, there is a small laceration on the mid aspect of the forearm overlying the radial artery which is fully healed.  There are 2 staples which were later taken out.  There is a pronounced swelling near the incision measuring about 3 cm in diameter and essentially round and characteristic and it has some pulsatility appreciated. There is no evidence of infection and the overlying incision is fully healed.  Deejay's test was performed with the radial artery compressed and there is still excellent perfusion of the entire hand.  This was confirmed with pulse oximetry on the thumb which showed no change in number or waveform with the radial artery compressed, and specifically the waveform is normal and the oximetry is about 94%.  LUE: warm, cap refill<3 seconds, no edema, no tissue loss,   RLE: warm, cap refill<3 seconds, no edema, no tissue loss,   LLE: warm, cap refill<3 seconds, no edema, no tissue loss,       Labs:                      Radiology:  Ultrasound shows a pseudoaneurysm of the right mid radial artery      Assessment/Plan:   -Pseudoaneurysm of the right mid radial artery    Pseudoaneurysm was treated at the bedside with thrombin injection.  Dynamic images with ultrasound showed that the pseudoaneurysm completely thrombosed.  The hand still had complete intact perfusion with a pulse oximetry of 94% of the thumb.  Patient was provided with my clinic information so that he can call and make a follow-up appointment in about 2 weeks or sooner if concerns arise in the meantime      Devonte Erwin MD  [facility]   [facility] preferred or call my office [phone]  __________________________________________________________________  Patient:Antonella Sanders   MRN:[id number]   CSN:[id number]

## 2023-05-29 NOTE — ED TRIAGE NOTES
"Chief Complaint   Patient presents with    Wound Check     Pt arrives for staple removal of a laceration to the R forearm, pt was unsure of when the staples needed to be removed  2 staples remain, pt states removing 1 staple 2 days ago  Laceration does not appear to show signs of infection   There is a golf ball sized area of swelling, firm to touch adjacent to the wound   Pt states the swelling occurred a few days after having the staples placed    [id number] Abbe     /73   Pulse 80   Temp 36.6 °C (97.9 °F) (Temporal)   Resp 18   Ht 1.676 m (5' 6\")   Wt 88.5 kg (195 lb)   SpO2 96%   BMI 31.47 kg/m²     "

## 2023-05-29 NOTE — ED NOTES
Pt provided d/c instructions using  ipad ID: [id number] Ezekiel. Pt verbalizes understanding with no further questions. Home care instructions explained. Pt ambulatory to ED cindi'

## 2023-12-08 NOTE — ED NOTES
Updated pt poc   Bill J-Code: yes Route: [place] Type Of Vial Used?: Multi-Dose Detail Level: None Dose Administered (Numbers Only): 0 Bill For Wasted Drug?: no Expiration Date (Optional): 2/10/24 Post-Care Instructions: I reviewed with the patient in detail post-care instructions. Patient understands to keep the injection sites clean and call the clinic if there is any redness, swelling or pain. Total Volume Injected In Cc (Will Not Affected Billing): 3 Treatment Number: 1 Consent: The risks of the medication were reviewed with the patient. Administered By (Optional): Dr. Bey Lot # (Optional): [id number] Procedure Information: Please note that the numeric value listed in the Medication (1) and associated J-code units and Medication (2) and associated J-code units variables are j-code amounts and do not represent either the concentration or the total amount of the medications injected.  I strongly recommend selecting no to the Render J-code information in note question. This will allow your note to be more clear. If you are billing j-codes with your injection codes you need to document the total amount of the medication injected. This amount should match the j-code units. For example, if you are injecting Triamcinolone 40mg as an intramuscular injection you would select 40 for the dose field.. This would allow you to document  with 4 units (40mg = 10mg x 4). The total volume is not used to calculate j-codes only the amount of the medication administered. Medication (1) And Associated J-Code Units: Saline

## 2024-05-07 ENCOUNTER — APPOINTMENT (OUTPATIENT)
Dept: RADIOLOGY | Facility: MEDICAL CENTER | Age: 39
End: 2024-05-07
Attending: EMERGENCY MEDICINE

## 2024-05-07 ENCOUNTER — HOSPITAL ENCOUNTER (EMERGENCY)
Facility: MEDICAL CENTER | Age: 39
End: 2024-05-07
Attending: EMERGENCY MEDICINE

## 2024-05-07 VITALS
RESPIRATION RATE: 20 BRPM | OXYGEN SATURATION: 97 % | SYSTOLIC BLOOD PRESSURE: 133 MMHG | DIASTOLIC BLOOD PRESSURE: 78 MMHG | HEART RATE: 72 BPM | BODY MASS INDEX: 33.09 KG/M2 | WEIGHT: 205.91 LBS | HEIGHT: 66 IN | TEMPERATURE: 98 F

## 2024-05-07 DIAGNOSIS — S40.022A CONTUSION OF LEFT UPPER ARM, INITIAL ENCOUNTER: ICD-10-CM

## 2024-05-07 DIAGNOSIS — S41.112A LACERATION OF LEFT UPPER ARM, INITIAL ENCOUNTER: ICD-10-CM

## 2024-05-07 RX ORDER — LIDOCAINE HYDROCHLORIDE AND EPINEPHRINE BITARTRATE 20; .01 MG/ML; MG/ML
10 INJECTION, SOLUTION SUBCUTANEOUS ONCE
Status: COMPLETED | OUTPATIENT
Start: 2024-05-07 | End: 2024-05-07

## 2024-05-07 RX ORDER — IBUPROFEN 600 MG/1
600 TABLET ORAL ONCE
Status: COMPLETED | OUTPATIENT
Start: 2024-05-07 | End: 2024-05-07

## 2024-05-07 RX ORDER — HYDROCODONE BITARTRATE AND ACETAMINOPHEN 5; 325 MG/1; MG/1
1 TABLET ORAL ONCE
Status: COMPLETED | OUTPATIENT
Start: 2024-05-07 | End: 2024-05-07

## 2024-05-07 RX ADMIN — HYDROCODONE BITARTRATE AND ACETAMINOPHEN 1 TABLET: 5; 325 TABLET ORAL at 01:46

## 2024-05-07 RX ADMIN — IBUPROFEN 600 MG: 600 TABLET, FILM COATED ORAL at 01:46

## 2024-05-07 RX ADMIN — LIDOCAINE HYDROCHLORIDE,EPINEPHRINE BITARTRATE 10 ML: 20; .01 INJECTION, SOLUTION INFILTRATION; PERINEURAL at 02:45

## 2024-05-07 ASSESSMENT — FIBROSIS 4 INDEX: FIB4 SCORE: 0.85

## 2024-05-07 NOTE — ED PROVIDER NOTES
"ED Provider Note    CHIEF COMPLAINT  Chief Complaint   Patient presents with    T-5000     Pt reports falling off of a small ladder, landing on his tool box. Denies headstrike, -LOC, -thinners.     Arm Pain     Pt has a laceration to his L upper arm.        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 male who presents to the emergency department through triage with left upper arm pain.  Patient states he was just about 3 feet off the ground on a ladder when he lost his balance and fell onto the left arm.  However he fell onto a couple of the tools he was working with and has a laceration to the left upper arm.  Pain with range of motion of the upper arm, shoulder.    Denies head injury or loss of consciousness.  Denies neck pain or back pain.  Denies chest pain, abdominal pain or shortness of breath.  Last tetanus 1 year ago.  No medication for discomfort prior to arrival.    PAST MEDICAL HISTORY   Denies    SURGICAL HISTORY  patient denies any surgical history    FAMILY HISTORY  History reviewed. No pertinent family history.    SOCIAL HISTORY  Social History     Tobacco Use    Smoking status: Never    Smokeless tobacco: Never   Substance and Sexual Activity    Alcohol use: Never    Drug use: Never    Sexual activity: Not on file       CURRENT MEDICATIONS  Home Medications       Reviewed by Josselyn Miller R.N. (Registered Nurse) on 05/07/24 at 0029  Med List Status: Partial     Medication Last Dose Status        Patient Arya Taking any Medications                           ALLERGIES  No Known Allergies    PHYSICAL EXAM  VITAL SIGNS: BP (!) 141/83   Pulse 69   Temp 36.2 °C (97.2 °F) (Temporal)   Resp 18   Ht 1.676 m (5' 6\")   Wt 93.4 kg (205 lb 14.6 oz)   SpO2 98%   BMI 33.23 kg/m²    Pulse ox " interpretation: I interpret this pulse ox as normal.  Constitutional: Alert in no apparent distress.  HENT: Normocephalic, atraumatic, no cephalohematoma. Bilateral external ears normal, Nose normal. Moist mucous membranes.  No oral trauma  Eyes: Pupils are equal and reactive, Conjunctiva normal.   Neck: Normal range of motion without pain or resistance.  Cardiovascular: Regular rate and rhythm, no murmurs. Distal pulses intact.    Thorax & Lungs: Normal breath sounds.  No wheezing/rales/ronchi. No increased work of breathing.  No chest wall tenderness, crepitus.  Abdomen: Soft, non-distended, non-tender to palpation.   Skin: Warm, Dry  Musculoskeletal: 2 cm laceration of the left upper arm overlying the inferior deltoid without hematoma or active bleeding.  No crepitus or deformity.  Pain with palpation in the proximal humerus range of motion.  Clavicle, elbow, wrist, fingers unremarkable.  2+ radial pulse.   intact.  Sensation intact to light touch at distal extremity.  Other extremities are unremarkable.    Neurologic: Alert and orient x 4.  Speech clear and cohesive.      RADIOLOGY/PROCEDURES   I have independently interpreted the diagnostic imaging associated with this visit and am waiting the final reading from the radiologist.   My preliminary interpretation is as follows:   Humerus x-ray: No fracture, dislocation, foreign body    Radiologist interpretation:  DX-HUMERUS 2+ LEFT   Final Result         1.  No acute traumatic bony injury.        PROCEDURE  LACERATION REPAIR PROCEDURE NOTE  The patient's identification was confirmed and consent was obtained.  This procedure was performed by Dr. Jeff  Site: Left upper arm laceration  Sterile procedures observed  Anesthetic used (type and amt): Lidocaine 2% with epinephrine, 3 cc  Suture type/size: Staples  Length: 2 cm  # of staples: 3  Complexity: Simple  Antibx ointment applied  Tetanus UTD  Site anesthetized, irrigated with NS, explored without evidence of  foreign body, wound well approximated, site covered with dry, sterile dressing. Patient tolerated procedure well without complications. Instructions for care discussed verbally and patient provided with additional written instructions for homecare and f/u.        COURSE & MEDICAL DECISION MAKING    ASSESSMENT, COURSE AND PLAN  Care Narrative:   Seen evaluated bedside.  Left upper extremity pain after a short fall from ladder onto tools.  CMS intact distally.  No other physical clinical evidence for trauma.  Exam as above.  At humerus x-ray.  Tetanus up-to-date.  Will repair wound there after.    X-ray negative.    0315 -left upper extremity wound repaired as described above with good hemostasis and approximation.  CMS intact distally.  He has full flexion, extension at the shoulder, pain with abduction but intact and equal bilaterally.  Distal pulses, sensation intact bilaterally.    ADDITIONAL PROBLEMS MANAGED    DISPOSITION AND DISCUSSIONS  ED evaluation most consistent with left upper arm laceration, soft tissue contusion.  CMS intact distally.  X-ray negative for fracture or dislocation.  Laceration repaired as described above with good hemostasis approximation.  Tetanus is up-to-date.  Pain controlled with Motrin and Norco.  He will follow-up with ED or primary care for wound check and staple removal.  Wound care instructions provided.    Discussion of management with other [facility] or appropriate source(s): None     Escalation of care considered, and ultimately not performed:diagnostic imaging superficial appearing wound, CMS intact distally, no indication for CTA    The patient is stable for discharge home, anticipatory guidance and wound care instructions provided, Tylenol or ibuprofen as needed for discomfort, close follow-up is encouraged and strict return instructions have been discussed. Patient is agreeable to the disposition and plan.      FINAL DIAGNOSIS  1. Laceration of left upper arm, initial encounter     2. Contusion of left upper arm, initial encounter           Electronically signed by: Mary Carmen Jeff D.O., 5/7/2024 3:23 AM

## 2024-05-07 NOTE — DISCHARGE INSTRUCTIONS
Follow-up with primary care or ED in 48 hours for reevaluation and wound check with referral to orthopedics for any persistent pain or limitation with range of motion.  Follow-up with emergency department 7 to 10 days for staple removal.    Tylenol or ibuprofen as needed for discomfort.    Weightbearing and activity as tolerated.    Keep wound clean and dry.  You may cleanse gently with warm water, soap, pat dry.  Apply antibiotic ointment twice daily.  Keep covered while outdoors or active.    Return to the emergency department for persistent or worsening pain, swelling, discoloration, decreased function/range of motion, bleeding or other drainage, fever or other new concerns.

## 2024-05-07 NOTE — ED TRIAGE NOTES
"Chief Complaint   Patient presents with    T-5000     Pt reports falling off of a small ladder, landing on his tool box. Denies headstrike, -LOC, -thinners.     Arm Pain     Pt has a laceration to his L upper arm.        Pt is alert and oriented, speaking in full sentences, follows commands and responds appropriately to questions. Resperations are even and unlabored.      Pt placed in lobby. Pt educated on triage process. Pt encouraged to alert staff for any changes.     Patient and staff wearing appropriate PPE.    BP (!) 141/83   Pulse 69   Temp 36.2 °C (97.2 °F) (Temporal)   Resp 18   Ht 1.676 m (5' 6\")   Wt 93.4 kg (205 lb 14.6 oz)   SpO2 98%    "

## 2024-10-19 ENCOUNTER — APPOINTMENT (OUTPATIENT)
Dept: RADIOLOGY | Facility: MEDICAL CENTER | Age: 39
End: 2024-10-19
Attending: STUDENT IN AN ORGANIZED HEALTH CARE EDUCATION/TRAINING PROGRAM
Payer: OTHER MISCELLANEOUS

## 2024-10-19 ENCOUNTER — HOSPITAL ENCOUNTER (EMERGENCY)
Facility: MEDICAL CENTER | Age: 39
End: 2024-10-20
Attending: STUDENT IN AN ORGANIZED HEALTH CARE EDUCATION/TRAINING PROGRAM
Payer: OTHER MISCELLANEOUS

## 2024-10-19 DIAGNOSIS — S09.90XA CLOSED HEAD INJURY, INITIAL ENCOUNTER: ICD-10-CM

## 2024-10-19 DIAGNOSIS — S16.1XXA STRAIN OF NECK MUSCLE, INITIAL ENCOUNTER: ICD-10-CM
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
## 2024-10-19 PROCEDURE — 99284 EMERGENCY DEPT VISIT MOD MDM: CPT | Mod: EDC

## 2024-10-19 PROCEDURE — 700102 HCHG RX REV CODE 250 W/ 637 OVERRIDE(OP): Performed by: STUDENT IN AN ORGANIZED HEALTH CARE EDUCATION/TRAINING PROGRAM

## 2024-10-19 PROCEDURE — 71045 X-RAY EXAM CHEST 1 VIEW: CPT

## 2024-10-19 PROCEDURE — A9270 NON-COVERED ITEM OR SERVICE: HCPCS | Performed by: STUDENT IN AN ORGANIZED HEALTH CARE EDUCATION/TRAINING PROGRAM

## 2024-10-19 RX ORDER — IBUPROFEN 600 MG/1
600 TABLET, FILM COATED ORAL ONCE
Status: COMPLETED | OUTPATIENT
Start: 2024-10-19 | End: 2024-10-19

## 2024-10-19 RX ORDER — ACETAMINOPHEN 325 MG/1
650 TABLET ORAL ONCE
Status: COMPLETED | OUTPATIENT
Start: 2024-10-19 | End: 2024-10-19

## 2024-10-19 RX ADMIN — ACETAMINOPHEN 650 MG: 325 TABLET ORAL at 23:51

## 2024-10-19 RX ADMIN — IBUPROFEN 600 MG: 600 TABLET, FILM COATED ORAL at 23:51

## 2024-10-19 ASSESSMENT — FIBROSIS 4 INDEX: FIB4 SCORE: 0.87

## 2024-10-20 VITALS
DIASTOLIC BLOOD PRESSURE: 73 MMHG | RESPIRATION RATE: 18 BRPM | OXYGEN SATURATION: 95 % | SYSTOLIC BLOOD PRESSURE: 110 MMHG | BODY MASS INDEX: 35.25 KG/M2 | TEMPERATURE: 98 F | WEIGHT: 219.36 LBS | HEART RATE: 88 BPM | HEIGHT: 66 IN

## 2024-10-20 RX ORDER — ACETAMINOPHEN 325 MG/1
650 TABLET ORAL EVERY 4 HOURS PRN
Qty: 30 TABLET | Refills: 0 | Status: SHIPPED | OUTPATIENT
Start: 2024-10-20

## 2024-10-20 RX ORDER — METHOCARBAMOL 500 MG/1
500 TABLET, FILM COATED ORAL 3 TIMES DAILY
Qty: 15 TABLET | Refills: 0 | Status: SHIPPED | OUTPATIENT
Start: 2024-10-20

## 2024-10-20 RX ORDER — IBUPROFEN 400 MG/1
400 TABLET, FILM COATED ORAL EVERY 6 HOURS PRN
Qty: 30 TABLET | Refills: 0 | Status: SHIPPED | OUTPATIENT
Start: 2024-10-20 | End: 2024-10-20

## 2024-10-20 RX ORDER — LIDOCAINE 4 G/G
1 PATCH TOPICAL EVERY 24 HOURS
Qty: 10 PATCH | Refills: 0 | Status: SHIPPED | OUTPATIENT
Start: 2024-10-20 | End: 2024-10-20

## 2024-10-20 RX ORDER — IBUPROFEN 400 MG/1
400 TABLET, FILM COATED ORAL EVERY 6 HOURS PRN
Qty: 30 TABLET | Refills: 0 | Status: SHIPPED | OUTPATIENT
Start: 2024-10-20

## 2024-10-20 RX ORDER — ACETAMINOPHEN 325 MG/1
650 TABLET ORAL EVERY 4 HOURS PRN
Qty: 30 TABLET | Refills: 0 | Status: SHIPPED | OUTPATIENT
Start: 2024-10-20 | End: 2024-10-20

## 2024-10-20 RX ORDER — LIDOCAINE 4 G/G
1 PATCH TOPICAL EVERY 24 HOURS
Qty: 10 PATCH | Refills: 0 | Status: SHIPPED | OUTPATIENT
Start: 2024-10-20

## 2024-10-20 RX ORDER — METHOCARBAMOL 500 MG/1
500 TABLET, FILM COATED ORAL 3 TIMES DAILY
Qty: 15 TABLET | Refills: 0 | Status: SHIPPED | OUTPATIENT
Start: 2024-10-20 | End: 2024-10-20